# Patient Record
Sex: MALE
[De-identification: names, ages, dates, MRNs, and addresses within clinical notes are randomized per-mention and may not be internally consistent; named-entity substitution may affect disease eponyms.]

---

## 2018-10-12 ENCOUNTER — HOSPITAL ENCOUNTER (EMERGENCY)
Dept: HOSPITAL 31 - C.ER | Age: 33
Discharge: HOME | End: 2018-10-12
Payer: SELF-PAY

## 2018-10-12 VITALS
HEART RATE: 78 BPM | TEMPERATURE: 99.2 F | SYSTOLIC BLOOD PRESSURE: 146 MMHG | OXYGEN SATURATION: 99 % | DIASTOLIC BLOOD PRESSURE: 78 MMHG

## 2018-10-12 VITALS — RESPIRATION RATE: 20 BRPM

## 2018-10-12 DIAGNOSIS — S39.012A: Primary | ICD-10-CM
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
## 2018-10-12 LAB
BACTERIA #/AREA URNS HPF: (no result) /[HPF]
BILIRUB UR-MCNC: NEGATIVE MG/DL
GLUCOSE UR STRIP-MCNC: NORMAL MG/DL
LEUKOCYTE ESTERASE UR-ACNC: (no result) LEU/UL
PH UR STRIP: 7 [PH] (ref 5–8)
PROT UR STRIP-MCNC: NEGATIVE MG/DL
RBC # UR STRIP: NEGATIVE /UL
SP GR UR STRIP: 1.02 (ref 1–1.03)
SPERM URINE: (no result) /HPF
SQUAMOUS EPITHIAL: < 1 /HPF (ref 0–5)
UROBILINOGEN UR-MCNC: NORMAL MG/DL (ref 0.2–1)

## 2018-10-12 NOTE — C.PDOC
History Of Present Illness





32 yo male come in for evaluation of left sided flank/lower back pain gradually 

developed since yesterday " after was lifting heavy boxes".  Pt reports, pain is

localized, non-radiating and worse with movement. Pt denies known direct trauma 

or injury, abd. pain, N/V/D, UTI sx, saddle anesthesia, incontinence, denies 

weakness, sensory or vascular deficits to B/L LES. Ambulate to Ed for 

evaluation, not in any apparent distress.


Time Seen by Provider: 10/12/18 20:29


Chief Complaint (Nursing): Back Pain


History Per: Patient





Past Medical History


Reviewed: Historical Data, Nursing Documentation, Vital Signs


Vital Signs: 





                                Last Vital Signs











Temp  99.2 F   10/12/18 20:16


 


Pulse  78   10/12/18 20:16


 


Resp  14   10/12/18 20:16


 


BP  146/78   10/12/18 20:16


 


Pulse Ox  99   10/12/18 20:16














- Medical History


PMH: No Chronic Diseases


Family History: States: No Known Family Hx





- Social History


Hx Alcohol Use: No


Hx Substance Use: No





- Immunization History


Hx Tetanus Toxoid Vaccination: No


Hx Influenza Vaccination: No


Hx Pneumococcal Vaccination: No





Review Of Systems


Except As Marked, All Systems Reviewed And Found Negative.


Constitutional: Negative for: Fever, Chills


ENT: Negative for: Throat Pain


Cardiovascular: Negative for: Chest Pain


Respiratory: Negative for: Cough, Shortness of Breath


Gastrointestinal: Negative for: Nausea, Vomiting, Abdominal Pain, Diarrhea


Genitourinary: Negative for: Dysuria, Incontinence


Musculoskeletal: Positive for: Back Pain.  Negative for: Neck Pain


Skin: Negative for: Rash


Neurological: Negative for: Weakness, Numbness, Headache, Dizziness





Physical Exam





- Physical Exam


Appears: Well, Non-toxic, No Acute Distress


Skin: Normal Color, Warm, Dry, No Rash


Head: Normacephalic


Eye(s): bilateral: PERRL


Nose: Normal


Throat: No Erythema, No Drooling


Neck: Trachea Midline, Supple


Gastrointestinal/Abdominal: Soft, No Tenderness, No Distention, No Guarding, No 

Rebound


Back: No CVA Tenderness, No Vertebral Tenderness, Paraspinal Tenderness (left 

sided diffuse lumbar), Other (left flank tenderness, no skin changes.)


Extremity: Normal ROM, No Calf Tenderness, No Deformity, No Swelling


Extremity: Bilateral: Atraumatic


Neurological/Psych: Oriented x3, Normal Speech, Normal Motor, Normal Sensation, 

Normal Reflexes





ED Course And Treatment


O2 Sat by Pulse Oximetry: 99


Pulse Ox Interpretation: Normal





- Other Rad


  ** L-spine


X-Ray: Interpreted by Me, Viewed By Me


Interpretation: (-) acute fx or sublux


Progress Note: On re-eval, pt is afebrile, hemodynamicaly stable.  Non-toxic.  

Ambulatory in ED with stable gait.  ENT: No acute findings.  Abd: benign.  back:

(-) CVA tenderness.  Neuorlogicaly intact.  Imaging review and appeas normal.  

UA- negative.  Pt has clinical findings c/w Left sided flank/lower back strain. 

Pt advised.  ref. to F/U with PMD in 2-3 days for re-eavl.  return if any new 

changes.





Disposition


Counseled Patient/Family Regarding: Studies Performed, Diagnosis, Need For 

Followup, Rx Given





- Disposition


Referrals: 


[facility] [Outside]


Disposition: HOME/ ROUTINE


Disposition Time: 21:26


Condition: STABLE


Additional Instructions: 


LIght duty, avoid physical activity for r1 week, heavy lifting, etc


Take pain medication as prescribed as need


Follow up with PMD in2-3 days for re-evaluation.


return to ED if any worsening or new changes.


Prescriptions: 


Gabapentin [Neurontin] 300 mg PO Q12 #10 cap


Ibuprofen [Motrin Tab] 600 mg PO BID #14 tab


traMADol [Ultram] 50 mg PO Q12 #7 tab


Instructions:  Lumbar Muscle Strain (DC)


Forms:  [facility] (English), Work Excuse





- Clinical Impression


Clinical Impression: 


 Low back strain

## 2018-10-13 NOTE — RAD
Date of service: 10/12/2018



PROCEDURE:  Radiographs of the Lumbar Spine.



HISTORY:

pain



COMPARISON:

None available.



FINDINGS:



BONES:

Alignment appears satisfactory. No listhesis. No acute displaced 

fracture identified.



DISC SPACES:

Unremarkable.



OTHER FINDINGS:

None.



IMPRESSION:

No acute displaced fracture or subluxation identified.